# Patient Record
(demographics unavailable — no encounter records)

---

## 2025-02-20 NOTE — ASSESSMENT
[FreeTextEntry1] : 5 minutes were spent adminsitering an evidence based ASCVD Risk Assessment tool showing patient's risk being ~1% and discussing results with patient including lifestyle modificationms to be taken. ALready on statin. Will titrate dose as needed.  Stop Metformin and start Ozempic.  Still due for CRC screen.

## 2025-02-20 NOTE — HEALTH RISK ASSESSMENT
[0] : 2) Feeling down, depressed, or hopeless: Not at all (0) [PHQ-2 Negative - No further assessment needed] : PHQ-2 Negative - No further assessment needed [Never] : Never [[id number]] : 0

## 2025-02-20 NOTE — HISTORY OF PRESENT ILLNESS
[de-identified] : Presents for f/u. Last seen over the summer. Metformin restarted as she met DM criteria. Exercises as time allows and reports healthy diet. Wants to try GLP-1.  Started Crestor for high lipids. Had MAMMO.

## 2025-02-20 NOTE — HISTORY OF PRESENT ILLNESS
[de-identified] : Presents for f/u. Last seen over the summer. Metformin restarted as she met DM criteria. Exercises as time allows and reports healthy diet. Wants to try GLP-1.  Started Crestor for high lipids. Had MAMMO.

## 2025-02-20 NOTE — HEALTH RISK ASSESSMENT
[0] : 2) Feeling down, depressed, or hopeless: Not at all (0) [PHQ-2 Negative - No further assessment needed] : PHQ-2 Negative - No further assessment needed [Never] : Never [[id number]] : 0

## 2025-04-15 NOTE — ASSESSMENT
[FreeTextEntry1] : Will icnrease Ozempic dose to 0.5 mg and f/u 8 weeks. Will repeat labs at that time.  Will start statin if need be based on that ASCVD risk calc.

## 2025-04-15 NOTE — HISTORY OF PRESENT ILLNESS
[de-identified] : Presents for f/u. On Ozempic for 6 weeks. SLight decrease in weight. Not taking statin. Mother just had CABG. No side effects.